# Patient Record
Sex: FEMALE | Race: WHITE | NOT HISPANIC OR LATINO | Employment: PART TIME | URBAN - METROPOLITAN AREA
[De-identification: names, ages, dates, MRNs, and addresses within clinical notes are randomized per-mention and may not be internally consistent; named-entity substitution may affect disease eponyms.]

---

## 2022-04-21 ENCOUNTER — OFFICE VISIT (OUTPATIENT)
Dept: ORTHOPEDIC SURGERY | Facility: CLINIC | Age: 22
End: 2022-04-21

## 2022-04-21 VITALS
DIASTOLIC BLOOD PRESSURE: 84 MMHG | SYSTOLIC BLOOD PRESSURE: 120 MMHG | BODY MASS INDEX: 24.91 KG/M2 | HEIGHT: 66 IN | WEIGHT: 155 LBS

## 2022-04-21 DIAGNOSIS — M25.561 RIGHT KNEE PAIN, UNSPECIFIED CHRONICITY: Primary | ICD-10-CM

## 2022-04-21 DIAGNOSIS — M25.361 PATELLAR INSTABILITY OF RIGHT KNEE: ICD-10-CM

## 2022-04-21 DIAGNOSIS — D16.21 OSTEOCHONDROMA OF FEMUR, RIGHT: ICD-10-CM

## 2022-04-21 PROCEDURE — 99204 OFFICE O/P NEW MOD 45 MIN: CPT | Performed by: ORTHOPAEDIC SURGERY

## 2022-04-21 NOTE — PROGRESS NOTES
[facility] [place] Orthopaedic Surgery Clinic Note        Subjective     Pain of the Right Knee (NP c/o R Knee pain)      JES Wetzel is a 21 y.o. female who presents with new problem of: right knee pain.  Onset: mechanical fall. The issue has been ongoing for 6 year(s). Pain is a 8/10 on the pain scale. Pain is described as shooting. Associated symptoms include popping and giving way/buckling. The pain is worse with leisure and riding horses; resting improve the pain. Previous treatments have included: bracing.    I have reviewed the following portions of the patient's history:History of Present Illness and review of systems.       Patient is here today for evaluation of her right knee.  Patient's first patella dislocation was in 2018 kneeling down to  a friend's crutch.  Since that time, she has had 3 more dislocations.  The last one was about a month ago.  She is never had any treatment other than bracing.  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  She notices a deformity in the right knee more than the left knee.      Past Medical History:   Diagnosis Date   • Subluxation of patella 2018    Getting more frequent      No past surgical history on file.   Family History   Problem Relation Age of Onset   • Broken bones Mother         Just broke her fibula   • Cancer Maternal Grandfather         Esophageal     Social History     Socioeconomic History   • Marital status: Single   Tobacco Use   • Smoking status: Never Smoker   Substance and Sexual Activity   • Alcohol use: Never   • Drug use: Never   • Sexual activity: Never      Current Outpatient Medications on File Prior to Visit   Medication Sig Dispense Refill   • Sprintec 28 0.25-35 MG-MCG per tablet        No current facility-administered medications on file prior to visit.      No Known Allergies       Review of Systems     I reviewed the patient's chief complaint, history of present illness, review of systems, past medical history, surgical history, family history,  "social history, medications and allergy list.        Objective      Physical Exam  /84   Ht 167.6 cm (66\")   Wt 70.3 kg (155 lb)   BMI 25.02 kg/m²     Body mass index is 25.02 kg/m².    General  Mental Status - alert  General Appearance - cooperative, well groomed, not in acute distress  Orientation - Oriented X3  Build & Nutrition - well developed and well nourished  Posture - normal posture  Gait - normal gait       Ortho Exam  Right knee: Genu valgum more so than the contralateral side.  There is a small valgus thrust noted.  Nontender over the distal femur.  Increased lateral patellofemoral tilt.  Grade 2-3 patellar glide.  Q angle is about 15 to 18 degrees.  No joint line tenderness.    Imaging/Studies  Imaging Results (Last 24 Hours)     ** No results found for the last 24 hours. **        We reviewed images from an x-ray from 3/18/2022 brought in with the patient.  Patient appears to have an osteochondroma in the distal femur.  There is genu valgum noted.  No obvious joint line tenderness or fractures are noted.    Assessment    Assessment:  1. Right knee pain, unspecified chronicity    2. Patellar instability of right knee    3. Osteochondroma of femur, right        Plan:  1. Continue over-the-counter medication as needed for discomfort  2. Osteochondroma right femur--isolated.  Observe for now.  Does not seem to be causing her issues  3. 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  We will try course of physical therapy and [place] taping for now.  Her genu valgum deformity makes this a little more of a challenge to treat because definitive management would likely involve distal femoral osteotomy to correct her deformity which should take care of her patella issues as well.  I will see her back in about 6 to 8 weeks and will see how she is doing overall.  If she is interested in surgical intervention, we may " need to get her referred to Dr. Morris at the [facility].        Rigo Davis MD  04/21/22  11:17 EDT      Dictated Utilizing Dragon Dictation.

## 2022-08-02 ENCOUNTER — OFFICE VISIT (OUTPATIENT)
Dept: ORTHOPEDIC SURGERY | Facility: CLINIC | Age: 22
End: 2022-08-02

## 2022-08-02 VITALS
SYSTOLIC BLOOD PRESSURE: 108 MMHG | WEIGHT: 151.6 LBS | BODY MASS INDEX: 24.36 KG/M2 | HEIGHT: 66 IN | DIASTOLIC BLOOD PRESSURE: 58 MMHG

## 2022-08-02 DIAGNOSIS — D16.21 OSTEOCHONDROMA OF FEMUR, RIGHT: ICD-10-CM

## 2022-08-02 DIAGNOSIS — M25.361 PATELLAR INSTABILITY OF RIGHT KNEE: ICD-10-CM

## 2022-08-02 DIAGNOSIS — M25.561 RIGHT KNEE PAIN, UNSPECIFIED CHRONICITY: Primary | ICD-10-CM

## 2022-08-02 PROCEDURE — 99213 OFFICE O/P EST LOW 20 MIN: CPT | Performed by: ORTHOPAEDIC SURGERY

## 2022-08-02 NOTE — PROGRESS NOTES
"    [facility] [place] Orthopaedic Surgery Clinic Note        Subjective     CC: Follow-up (3.5 month f/u--Right knee pain)      JES Wetzel is a 22 y.o. female.  Patient is here today for follow-up of her right knee pain.  Last seen 4/21/2022.  She has chronic right patella instability in the face of significant genu valgum.  She was sent to physical therapy.  She has gotten a patella stabilizing brace that her mom ordered for her.  She is in nursing school.  She has had no further instability events.    Overall, patient's symptoms are as above.    ROS:    Constiutional:Pt denies fever, chills, nausea, or vomiting.  MSK:as above        Objective      Past Medical History  Past Medical History:   Diagnosis Date   • Subluxation of patella 2018    Getting more frequent         Physical Exam  /58   Ht 167.6 cm (65.98\")   Wt 68.8 kg (151 lb 9.6 oz)   BMI 24.48 kg/m²     Body mass index is 24.48 kg/m².    Patient is well nourished and well developed.        Ortho Exam  Genu valgum in both extension and flexion  Patella tracking is normal today  No effusion  No joint line tenderness    Imaging/Labs/EMG Reviewed:  Imaging Results (Last 24 Hours)     ** No results found for the last 24 hours. **            Assessment    Assessment:  1. Right knee pain, unspecified chronicity    2. Patellar instability of right knee    3. Osteochondroma of femur, right        Plan:  1. Recommend over the counter anti-inflammatories for pain and/or swelling  2. Chronic right knee pain with patella instability in the face of significant genu valgum.  Patient also has an osteochondroma on the right lateral aspect of the distal femur--patient has not had any further episodes of instability.  I recommended she continue to use her patella stabilizing brace during periods of activity.  Furthermore, she needs to continue to pay attention to avoiding certain activities with both her work and in her personal life such as twisting and squatting.  She " needs to continue to keep her quad and hamstring and core as strong as possible as well.  I will see her back as needed going forward.      Rigo Davis MD  08/02/22  11:40 EDT      Dictated Utilizing Dragon Dictation.